# Patient Record
Sex: FEMALE | Race: WHITE | Employment: OTHER | URBAN - METROPOLITAN AREA
[De-identification: names, ages, dates, MRNs, and addresses within clinical notes are randomized per-mention and may not be internally consistent; named-entity substitution may affect disease eponyms.]

---

## 2017-03-06 PROCEDURE — 87081 CULTURE SCREEN ONLY: CPT | Performed by: INTERNAL MEDICINE

## 2017-03-06 PROCEDURE — 81001 URINALYSIS AUTO W/SCOPE: CPT | Performed by: INTERNAL MEDICINE

## 2017-03-06 PROCEDURE — 87086 URINE CULTURE/COLONY COUNT: CPT | Performed by: INTERNAL MEDICINE

## 2017-03-10 ENCOUNTER — ANESTHESIA EVENT (OUTPATIENT)
Dept: SURGERY | Facility: HOSPITAL | Age: 82
DRG: 460 | End: 2017-03-10
Payer: MEDICARE

## 2017-03-14 ENCOUNTER — APPOINTMENT (OUTPATIENT)
Dept: GENERAL RADIOLOGY | Facility: HOSPITAL | Age: 82
DRG: 460 | End: 2017-03-14
Attending: ORTHOPAEDIC SURGERY
Payer: MEDICARE

## 2017-03-14 ENCOUNTER — ANESTHESIA (OUTPATIENT)
Dept: SURGERY | Facility: HOSPITAL | Age: 82
DRG: 460 | End: 2017-03-14
Payer: MEDICARE

## 2017-03-14 ENCOUNTER — SURGERY (OUTPATIENT)
Age: 82
End: 2017-03-14

## 2017-03-14 ENCOUNTER — HOSPITAL ENCOUNTER (INPATIENT)
Facility: HOSPITAL | Age: 82
LOS: 3 days | Discharge: NURSING, REHAB OR LONG-TERM CARE | DRG: 460 | End: 2017-03-17
Attending: ORTHOPAEDIC SURGERY | Admitting: ORTHOPAEDIC SURGERY
Payer: MEDICARE

## 2017-03-14 LAB
ERYTHROCYTE [DISTWIDTH] IN BLOOD BY AUTOMATED COUNT: 12.7 % (ref 11.5–16)
EST. AVERAGE GLUCOSE BLD GHB EST-MCNC: 126 MG/DL (ref 68–126)
GLUCOSE BLD-MCNC: 108 MG/DL (ref 65–99)
GLUCOSE BLD-MCNC: 123 MG/DL (ref 65–99)
GLUCOSE BLD-MCNC: 123 MG/DL (ref 65–99)
GLUCOSE BLD-MCNC: 124 MG/DL (ref 65–99)
HBA1C MFR BLD HPLC: 6 % (ref ?–5.7)
HCT VFR BLD AUTO: 39.1 % (ref 34–50)
HGB BLD-MCNC: 12.8 G/DL (ref 12–16)
MCH RBC QN AUTO: 31.9 PG (ref 27–33.2)
MCHC RBC AUTO-ENTMCNC: 32.7 G/DL (ref 31–37)
MCV RBC AUTO: 97.5 FL (ref 81–100)
PLATELET # BLD AUTO: 122 10(3)UL (ref 150–450)
RBC # BLD AUTO: 4.01 X10(6)UL (ref 3.8–5.1)
RED CELL DISTRIBUTION WIDTH-SD: 45.1 FL (ref 35.1–46.3)
WBC # BLD AUTO: 4.5 X10(3) UL (ref 4–13)

## 2017-03-14 PROCEDURE — 4A11X4G MONITORING OF PERIPHERAL NERVOUS ELECTRICAL ACTIVITY, INTRAOPERATIVE, EXTERNAL APPROACH: ICD-10-PCS | Performed by: ORTHOPAEDIC SURGERY

## 2017-03-14 PROCEDURE — 76001 XR FLUOROSCOPE EXAM >1 HR EXTENSIVE (CPT=76001): CPT

## 2017-03-14 PROCEDURE — 82962 GLUCOSE BLOOD TEST: CPT

## 2017-03-14 PROCEDURE — 83036 HEMOGLOBIN GLYCOSYLATED A1C: CPT | Performed by: HOSPITALIST

## 2017-03-14 PROCEDURE — 85027 COMPLETE CBC AUTOMATED: CPT | Performed by: PHYSICIAN ASSISTANT

## 2017-03-14 PROCEDURE — 0SG1071 FUSION OF 2 OR MORE LUMBAR VERTEBRAL JOINTS WITH AUTOLOGOUS TISSUE SUBSTITUTE, POSTERIOR APPROACH, POSTERIOR COLUMN, OPEN APPROACH: ICD-10-PCS | Performed by: ORTHOPAEDIC SURGERY

## 2017-03-14 DEVICE — GRAFT BN DMNR FBR 30ML: Type: IMPLANTABLE DEVICE | Site: BACK | Status: FUNCTIONAL

## 2017-03-14 DEVICE — FLOSEAL SEALENT STERILE 10ML: Type: IMPLANTABLE DEVICE | Site: BACK | Status: FUNCTIONAL

## 2017-03-14 DEVICE — DURASEAL® EXACT SPINAL SEALANT SYSTEM 5ML 5 PACK
Type: IMPLANTABLE DEVICE | Site: BACK | Status: FUNCTIONAL
Brand: DURASEAL EXACT SPINAL SEALANT SYSTEM

## 2017-03-14 DEVICE — DURAGENXS MATRIX DURAL REGENERATION MATRIX IS AN ABSORBABLE IMPLANT FOR REPAIR OF DURAL DEFECTS. DURAGENXS MATRIX IS AN EASY TO HANDLE, SOFT, WHITE, PLIABLE, NONFRIABLE, POROUS COLLAGEN MATRIX. DURAGENXS MATRIX IS SUPPLIED STERILE, NONPYROGENIC, FOR SINGLE USE IN DOUBLE PEEL PACKAGES IN A VARIETY OF SIZES.
Type: IMPLANTABLE DEVICE | Site: BACK | Status: FUNCTIONAL
Brand: DURAGEN XS™ DURAL REGENERATION MATRIX

## 2017-03-14 RX ORDER — LOSARTAN POTASSIUM 50 MG/1
50 TABLET ORAL DAILY
Status: DISCONTINUED | OUTPATIENT
Start: 2017-03-15 | End: 2017-03-17

## 2017-03-14 RX ORDER — CLONAZEPAM 0.5 MG/1
0.5 TABLET ORAL 2 TIMES DAILY PRN
Status: DISCONTINUED | OUTPATIENT
Start: 2017-03-14 | End: 2017-03-17

## 2017-03-14 RX ORDER — HYDROMORPHONE HYDROCHLORIDE 1 MG/ML
INJECTION, SOLUTION INTRAMUSCULAR; INTRAVENOUS; SUBCUTANEOUS
Status: COMPLETED
Start: 2017-03-14 | End: 2017-03-14

## 2017-03-14 RX ORDER — NALOXONE HYDROCHLORIDE 0.4 MG/ML
80 INJECTION, SOLUTION INTRAMUSCULAR; INTRAVENOUS; SUBCUTANEOUS AS NEEDED
Status: DISCONTINUED | OUTPATIENT
Start: 2017-03-14 | End: 2017-03-14 | Stop reason: HOSPADM

## 2017-03-14 RX ORDER — SODIUM PHOSPHATE, DIBASIC AND SODIUM PHOSPHATE, MONOBASIC 7; 19 G/133ML; G/133ML
1 ENEMA RECTAL ONCE AS NEEDED
Status: ACTIVE | OUTPATIENT
Start: 2017-03-14 | End: 2017-03-14

## 2017-03-14 RX ORDER — ATORVASTATIN CALCIUM 20 MG/1
20 TABLET, FILM COATED ORAL NIGHTLY
Status: DISCONTINUED | OUTPATIENT
Start: 2017-03-14 | End: 2017-03-17

## 2017-03-14 RX ORDER — CYCLOBENZAPRINE HCL 10 MG
10 TABLET ORAL EVERY 8 HOURS PRN
Status: DISCONTINUED | OUTPATIENT
Start: 2017-03-14 | End: 2017-03-17

## 2017-03-14 RX ORDER — DEXTROSE MONOHYDRATE 25 G/50ML
50 INJECTION, SOLUTION INTRAVENOUS
Status: DISCONTINUED | OUTPATIENT
Start: 2017-03-14 | End: 2017-03-17

## 2017-03-14 RX ORDER — SENNOSIDES 8.6 MG
17.2 TABLET ORAL NIGHTLY
Status: DISCONTINUED | OUTPATIENT
Start: 2017-03-14 | End: 2017-03-17

## 2017-03-14 RX ORDER — BUPIVACAINE HYDROCHLORIDE AND EPINEPHRINE 5; 5 MG/ML; UG/ML
INJECTION, SOLUTION EPIDURAL; INTRACAUDAL; PERINEURAL AS NEEDED
Status: DISCONTINUED | OUTPATIENT
Start: 2017-03-14 | End: 2017-03-14 | Stop reason: HOSPADM

## 2017-03-14 RX ORDER — BISACODYL 10 MG
10 SUPPOSITORY, RECTAL RECTAL
Status: DISCONTINUED | OUTPATIENT
Start: 2017-03-14 | End: 2017-03-17

## 2017-03-14 RX ORDER — GENTAMICIN SULFATE 40 MG/ML
INJECTION, SOLUTION INTRAMUSCULAR; INTRAVENOUS AS NEEDED
Status: DISCONTINUED | OUTPATIENT
Start: 2017-03-14 | End: 2017-03-14 | Stop reason: HOSPADM

## 2017-03-14 RX ORDER — PANTOPRAZOLE SODIUM 40 MG/1
40 TABLET, DELAYED RELEASE ORAL
Status: DISCONTINUED | OUTPATIENT
Start: 2017-03-15 | End: 2017-03-17

## 2017-03-14 RX ORDER — DOCUSATE SODIUM 100 MG/1
100 CAPSULE, LIQUID FILLED ORAL 2 TIMES DAILY
Status: DISCONTINUED | OUTPATIENT
Start: 2017-03-14 | End: 2017-03-17

## 2017-03-14 RX ORDER — POLYETHYLENE GLYCOL 3350 17 G/17G
17 POWDER, FOR SOLUTION ORAL DAILY PRN
Status: DISCONTINUED | OUTPATIENT
Start: 2017-03-14 | End: 2017-03-17

## 2017-03-14 RX ORDER — SODIUM CHLORIDE, SODIUM LACTATE, POTASSIUM CHLORIDE, CALCIUM CHLORIDE 600; 310; 30; 20 MG/100ML; MG/100ML; MG/100ML; MG/100ML
INJECTION, SOLUTION INTRAVENOUS CONTINUOUS
Status: DISCONTINUED | OUTPATIENT
Start: 2017-03-14 | End: 2017-03-17

## 2017-03-14 RX ORDER — DIPHENHYDRAMINE HCL 25 MG
25 CAPSULE ORAL EVERY 4 HOURS PRN
Status: DISCONTINUED | OUTPATIENT
Start: 2017-03-14 | End: 2017-03-17

## 2017-03-14 RX ORDER — HYDROCODONE BITARTRATE AND ACETAMINOPHEN 5; 325 MG/1; MG/1
1 TABLET ORAL EVERY 4 HOURS PRN
Status: DISCONTINUED | OUTPATIENT
Start: 2017-03-14 | End: 2017-03-17

## 2017-03-14 RX ORDER — DIPHENHYDRAMINE HYDROCHLORIDE 50 MG/ML
25 INJECTION INTRAMUSCULAR; INTRAVENOUS EVERY 4 HOURS PRN
Status: DISCONTINUED | OUTPATIENT
Start: 2017-03-14 | End: 2017-03-17

## 2017-03-14 RX ORDER — CYCLOBENZAPRINE HCL 5 MG
5 TABLET ORAL 3 TIMES DAILY
Qty: 60 TABLET | Refills: 0 | Status: SHIPPED | OUTPATIENT
Start: 2017-03-14 | End: 2017-05-01

## 2017-03-14 RX ORDER — HYDROCODONE BITARTRATE AND ACETAMINOPHEN 5; 325 MG/1; MG/1
2 TABLET ORAL EVERY 4 HOURS PRN
Status: DISCONTINUED | OUTPATIENT
Start: 2017-03-14 | End: 2017-03-17

## 2017-03-14 RX ORDER — DEXTROSE MONOHYDRATE 25 G/50ML
50 INJECTION, SOLUTION INTRAVENOUS
Status: DISCONTINUED | OUTPATIENT
Start: 2017-03-14 | End: 2017-03-14 | Stop reason: HOSPADM

## 2017-03-14 RX ORDER — LEVOTHYROXINE SODIUM 0.07 MG/1
75 TABLET ORAL
Status: DISCONTINUED | OUTPATIENT
Start: 2017-03-15 | End: 2017-03-17

## 2017-03-14 RX ORDER — HYDROCODONE BITARTRATE AND ACETAMINOPHEN 5; 325 MG/1; MG/1
1 TABLET ORAL EVERY 6 HOURS PRN
Qty: 60 TABLET | Refills: 0 | Status: SHIPPED | OUTPATIENT
Start: 2017-03-14 | End: 2017-03-27 | Stop reason: ALTCHOICE

## 2017-03-14 RX ORDER — ONDANSETRON 2 MG/ML
4 INJECTION INTRAMUSCULAR; INTRAVENOUS EVERY 4 HOURS PRN
Status: DISPENSED | OUTPATIENT
Start: 2017-03-14 | End: 2017-03-15

## 2017-03-14 RX ORDER — NALOXONE HYDROCHLORIDE 0.4 MG/ML
0.08 INJECTION, SOLUTION INTRAMUSCULAR; INTRAVENOUS; SUBCUTANEOUS
Status: DISCONTINUED | OUTPATIENT
Start: 2017-03-14 | End: 2017-03-17

## 2017-03-14 RX ORDER — ONDANSETRON 2 MG/ML
4 INJECTION INTRAMUSCULAR; INTRAVENOUS AS NEEDED
Status: DISCONTINUED | OUTPATIENT
Start: 2017-03-14 | End: 2017-03-14 | Stop reason: HOSPADM

## 2017-03-14 RX ORDER — METOCLOPRAMIDE HYDROCHLORIDE 5 MG/ML
10 INJECTION INTRAMUSCULAR; INTRAVENOUS EVERY 6 HOURS PRN
Status: DISCONTINUED | OUTPATIENT
Start: 2017-03-14 | End: 2017-03-17

## 2017-03-14 RX ORDER — NALBUPHINE HCL 10 MG/ML
2.5 AMPUL (ML) INJECTION EVERY 4 HOURS PRN
Status: DISCONTINUED | OUTPATIENT
Start: 2017-03-14 | End: 2017-03-17

## 2017-03-14 RX ORDER — ONDANSETRON 2 MG/ML
4 INJECTION INTRAMUSCULAR; INTRAVENOUS EVERY 6 HOURS PRN
Status: DISCONTINUED | OUTPATIENT
Start: 2017-03-15 | End: 2017-03-17

## 2017-03-14 RX ORDER — HYDROMORPHONE HYDROCHLORIDE 1 MG/ML
0.4 INJECTION, SOLUTION INTRAMUSCULAR; INTRAVENOUS; SUBCUTANEOUS EVERY 30 MIN PRN
Status: DISCONTINUED | OUTPATIENT
Start: 2017-03-14 | End: 2017-03-17

## 2017-03-14 RX ORDER — DIPHENHYDRAMINE HYDROCHLORIDE 50 MG/ML
12.5 INJECTION INTRAMUSCULAR; INTRAVENOUS EVERY 4 HOURS PRN
Status: DISCONTINUED | OUTPATIENT
Start: 2017-03-14 | End: 2017-03-17

## 2017-03-14 RX ORDER — HYDROMORPHONE HYDROCHLORIDE 1 MG/ML
0.4 INJECTION, SOLUTION INTRAMUSCULAR; INTRAVENOUS; SUBCUTANEOUS EVERY 5 MIN PRN
Status: DISCONTINUED | OUTPATIENT
Start: 2017-03-14 | End: 2017-03-14 | Stop reason: HOSPADM

## 2017-03-14 NOTE — ANESTHESIA PREPROCEDURE EVALUATION
PRE-OP EVALUATION    Patient Name: Jeffrey Fritz    Pre-op Diagnosis: SPINAL STENOSIS, LUMBAR 3-4, LUMBAR 4-5      Procedure(s):  LAMINECTOMY LUMBAR 3-LUMBAR 4, LUMBAR 4-LUMBAR 5  WITH IN SITU FUSION      Surgeon(s) and Role:     * Ramon Jeter MD MG Oral Capsule Delayed Release Take 1 capsule (40 mg total) by mouth every morning before breakfast. Disp: 90 capsule Rfl: 3   ClonazePAM 0.5 MG Oral Tab Take 1 tablet (0.5 mg total) by mouth 2 (two) times daily as needed for Anxiety.  Disp: 30 tablet Rfl: 26.6 03/06/2017   BUN 28* 03/06/2017   CREATSERUM 1.15* 03/06/2017   * 03/06/2017   CA 9.9 03/06/2017       Lab Results  Component Value Date   INR 1.0 03/06/2017         Airway      Mallampati: II  Mouth opening: >3 FB  TM distance: 4 - 6 cm  Neck

## 2017-03-14 NOTE — ANESTHESIA POSTPROCEDURE EVALUATION
[address] Patient Status:  Hospital Outpatient Surgery   Age/Gender 80year old female MRN [id number]   [facility] SURGERY Attending Lidia Crowley MD   Hosp Day # 0 PCP Nayana Grace MD       Anesthesia

## 2017-03-14 NOTE — PROGRESS NOTES
NURSING ADMISSION NOTE      Patient admitted via bed from PACU  Oriented to room. Family at bedside. Safety precautions initiated. Bed in low position. Call light in reach. Instructed to call for assistance always.   Discussed post op orders with pa

## 2017-03-14 NOTE — CONSULTS
[facility] Pharmacy Note:  Pain Consult    Corky Sparks is a 80year old female started on Dilaudid PCA by ALEJANDRO Sofia. Pharmacy was consulted to review medication profile and to discontinue previously ordered narcotics and sedatives.     Medication

## 2017-03-14 NOTE — OPERATIVE REPORT
[facility]  Operative Report    Carlos Enrique Jessup Patient Status:  Hospital Outpatient Surgery    1932 MRN [id number]   [facility] SURGERY Attending Shira Jean MD   Hosp Day # 0 PCP Ryder Rossi MD         PREOP irrigated, obtained lateral fluoroscopy, confirmed the level again. We amputated the spinous processes at the L3-4 and L4-5 levels, thinned out the lamina with a side cutting drill bit. We performed meticulous hemostasis at all time points.  Then, we drille

## 2017-03-14 NOTE — H&P
[facility]    Spine Surgery H&P  Dr. Wilfredo Franz Patient Status:  Hospital Outpatient Surgery    1932 MRN [id number]   Location [address] [place] [address] Attending General Betty MD   [facility]                 Current Medications:    Current Outpatient Prescriptions:  Esomeprazole Magnesium (NEXIUM) 40 MG Oral Capsule Delayed Release  Take 1 capsule (40 mg total) by mouth every morning before breakfast.  Disp: 30 capsule  Rfl: 4    valsartan ( unexpected wt gain or wt loss.   MUSCULOSKELETAL: DENIES:, Muscle cramps, Joint pain, Swelling of joints, History of arthritis, Fibromyalgia, Osteoporosis, Hardware, Deformity, Limited Range of motion, Crepitation, Gout, Heel spurs  ALLERGY/IMM.: denies aurelio No murmur. Abdomen:  Soft, non-distended, non-tender, with no rebound or guarding. No peritoneal signs. No ascites. Liver is within normal limits. Spleen is not palpable. Extremities:  No lower extremity edema noted. Without clubbing or cyanosis.  C

## 2017-03-15 PROBLEM — Z98.1 S/P LAMINECTOMY WITH SPINAL FUSION: Status: ACTIVE | Noted: 2017-03-15

## 2017-03-15 LAB
BASOPHILS # BLD AUTO: 0 X10(3) UL (ref 0–0.1)
BASOPHILS NFR BLD AUTO: 0 %
BUN BLD-MCNC: 21 MG/DL (ref 8–20)
CALCIUM BLD-MCNC: 9.1 MG/DL (ref 8.3–10.3)
CHLORIDE: 106 MMOL/L (ref 101–111)
CO2: 25 MMOL/L (ref 22–32)
CREAT BLD-MCNC: 0.99 MG/DL (ref 0.55–1.02)
EOSINOPHIL # BLD AUTO: 0 X10(3) UL (ref 0–0.3)
EOSINOPHIL NFR BLD AUTO: 0 %
ERYTHROCYTE [DISTWIDTH] IN BLOOD BY AUTOMATED COUNT: 12.6 % (ref 11.5–16)
GLUCOSE BLD-MCNC: 104 MG/DL (ref 65–99)
GLUCOSE BLD-MCNC: 111 MG/DL (ref 65–99)
GLUCOSE BLD-MCNC: 125 MG/DL (ref 65–99)
GLUCOSE BLD-MCNC: 85 MG/DL (ref 65–99)
GLUCOSE BLD-MCNC: 99 MG/DL (ref 70–99)
HCT VFR BLD AUTO: 35.2 % (ref 34–50)
HGB BLD-MCNC: 11.5 G/DL (ref 12–16)
IMMATURE GRANULOCYTE COUNT: 0.02 X10(3) UL (ref 0–1)
IMMATURE GRANULOCYTE RATIO %: 0.4 %
LYMPHOCYTES # BLD AUTO: 0.42 X10(3) UL (ref 0.9–4)
LYMPHOCYTES NFR BLD AUTO: 7.5 %
MCH RBC QN AUTO: 32 PG (ref 27–33.2)
MCHC RBC AUTO-ENTMCNC: 32.7 G/DL (ref 31–37)
MCV RBC AUTO: 98.1 FL (ref 81–100)
MONOCYTES # BLD AUTO: 0.46 X10(3) UL (ref 0.1–0.6)
MONOCYTES NFR BLD AUTO: 8.2 %
NEUTROPHIL ABS PRELIM: 4.71 X10 (3) UL (ref 1.3–6.7)
NEUTROPHILS # BLD AUTO: 4.71 X10(3) UL (ref 1.3–6.7)
NEUTROPHILS NFR BLD AUTO: 83.9 %
PLATELET # BLD AUTO: 116 10(3)UL (ref 150–450)
POTASSIUM SERPL-SCNC: 4.7 MMOL/L (ref 3.6–5.1)
RBC # BLD AUTO: 3.59 X10(6)UL (ref 3.8–5.1)
RED CELL DISTRIBUTION WIDTH-SD: 45.3 FL (ref 35.1–46.3)
SODIUM SERPL-SCNC: 140 MMOL/L (ref 136–144)
WBC # BLD AUTO: 5.6 X10(3) UL (ref 4–13)

## 2017-03-15 PROCEDURE — 97530 THERAPEUTIC ACTIVITIES: CPT

## 2017-03-15 PROCEDURE — 85025 COMPLETE CBC W/AUTO DIFF WBC: CPT | Performed by: HOSPITALIST

## 2017-03-15 PROCEDURE — 80048 BASIC METABOLIC PNL TOTAL CA: CPT | Performed by: HOSPITALIST

## 2017-03-15 PROCEDURE — 97162 PT EVAL MOD COMPLEX 30 MIN: CPT

## 2017-03-15 PROCEDURE — 82962 GLUCOSE BLOOD TEST: CPT

## 2017-03-15 RX ORDER — HYDRALAZINE HYDROCHLORIDE 25 MG/1
25 TABLET, FILM COATED ORAL EVERY 6 HOURS PRN
Status: DISCONTINUED | OUTPATIENT
Start: 2017-03-15 | End: 2017-03-17

## 2017-03-15 NOTE — PROGRESS NOTES
POD #1 spine newsletter provided to patient. Reviewed indications, side effects of pain medication/narcotics and constipation prevention.  Stressed importance of increased fluids/roughage in diet, continued use stool softeners along with laxatives and suppo

## 2017-03-15 NOTE — PROGRESS NOTES
[facility]  Progress Note    Pawan Saul Patient Status:  Inpatient    1932 MRN [id number]   [facility] 3SW-A Attending Adele Kaye MD   Hosp Day # 1 PCP Khoa Khan MD     Subjective:  Pawan Saul is a( bilaterally. Cardiac: Regular rate and rhythm. No murmur. Abdomen:  Soft, non-distended, non-tender, with no rebound or guarding. No peritoneal signs. No ascites. Liver is within normal limits. Spleen is not palpable.       Skin: Normal texture and tur

## 2017-03-15 NOTE — OCCUPATIONAL THERAPY NOTE
Patient not appropriate to see at this time for OT evaluation, is on bedrest today until 5pm according to RN. Will HOLD for OT services until bedrest is no longer prescribed; will re-attempt when appropriate.

## 2017-03-15 NOTE — CONSULTS
[facility] hospitalist initial consult note    Fredy Clark MD  consutled at the request of Dr. Gloria Laureano  Reason for consult medical co-management    HPI 81 yo female with hx of HTN, Dm2, GERD  Here s/p Laminectomy L3, L4, L5 with insitu fusi to Encounter:  valsartan 80 MG Oral Tab Take 1 tablet (80 mg total) by mouth daily. Disp: 90 tablet Rfl: 3   Pioglitazone HCl 15 MG Oral Tab Take 1 tablet (15 mg total) by mouth daily.  Disp: 90 tablet Rfl: 3   Atorvastatin Calcium 20 MG Oral Tab Take 1 tab

## 2017-03-15 NOTE — PHYSICAL THERAPY NOTE
Attempted to see patient for P.T.evaluation, Per RN - Sandra Lemon : patient is on flat bedrest until 5 pm.  Will resume P.T.evaluation once off bedrest.

## 2017-03-15 NOTE — PAYOR COMM NOTE
Attending Physician: Diane Leija MD    Review Type: ADMISSION   Reviewer: Letty Contreras       Date: March 15, 2017 - 10:41 AM  Payor: [address] [place] [address] Number: [id number]  Admit date: 3/14/2017  9:44 AM   Admitted from pertinent family history.                 Current Medications:    Current Outpatient Prescriptions:  Esomeprazole Magnesium (NEXIUM) 40 MG Oral Capsule Delayed Release   Take 1 capsule (40 mg total) by mouth every morning before breakfast.   Disp: 30 capsul Swelling of joints, History of arthritis, Fibromyalgia, Osteoporosis, Hardware, Deformity, Limited Range of motion, Crepitation, Gout, Heel spurs  ALLERGY/IMM.: denies food or seasonal allergies. No history of cancer, infectious disease exposure. difficulty swallowing. Exam is unremarkable.  Without scleral icterus.  Mucous membranes are moist. Pupils are equal and round, reactive to light and accommodate.  Pupils are approximately 3mm and react to 2mm with reaction to light.  Oropharynx is clear. Attending  Joselin Fleming MD    Hosp Day #  0  PCP  Ryder Rossi MD      PREOPERATIVE DIAGNOSIS:  Spinal stenosis  POSTOPERATIVE DIAGNOSIS:  Spinal stenosis , scoliosis  PROCEDURE PERFORMED:   Laminectomy L3, L4, L5 with insitu fusion L3/4 a with insitu fusion L3/4 and L4/5 with bone auto and allograft, dural repair , intraoperative fluoroscopy, use o Microscope.     S/p spine surgery: management per spine  Post-op pain; dilaudid IV ordered  DM2 SSI  HTN: on valsartan as outpatient, monitor BP lactated ringers infusion 125cc/h    Date Action Dose Route User    3/15/2017 0998 New Bag (none) Intravenous Yue Pinto RN    3/14/2017 1611 New Bag (none) Intravenous Ena Lainez RN      Levothyroxine Sodium (SYNTHROID, LEVOTHROID) tab 75 mcg

## 2017-03-15 NOTE — PHYSICAL THERAPY NOTE
PHYSICAL THERAPY EVALUATION - INPATIENT     Room Number: 380/380-A  Evaluation Date: 3/15/2017  Type of Evaluation: Initial  Physician Order: PT Eval and Treat    Presenting Problem: S/p Laminectomy L3,L4,L5 with Insitu Fusion L3-4,L4-5 with bone auto present. Patient self-stated goal is to be able to walk without pain.     OBJECTIVE  Precautions: Spine  Fall Risk: High fall risk    WEIGHT BEARING RESTRICTION  Weight Bearing Restriction: None                PAIN ASSESSMENT  Ratin (@ rest, Improved Approx Degree of Impairment Score: 50.57%   Standardized Score (AM-PAC Scale): 42.13   CMS Modifier (G-Code): CK    FUNCTIONAL ABILITY STATUS  Gait Assessment   Gait Assistance: Dependent assistance (Actual assist - min)  Distance (ft): 30 ft  Assistive Curtis Prime prior level of function. DISCHARGE RECOMMENDATIONS  PT Discharge Recommendations: Home (Family supervision & assist)    PLAN  PT Treatment Plan: Bed mobility; Endurance; Energy conservation;Patient education; Family education;Gait training;Neuromuscular re

## 2017-03-16 LAB
BASOPHILS # BLD AUTO: 0.02 X10(3) UL (ref 0–0.1)
BASOPHILS NFR BLD AUTO: 0.3 %
EOSINOPHIL # BLD AUTO: 0 X10(3) UL (ref 0–0.3)
EOSINOPHIL NFR BLD AUTO: 0 %
ERYTHROCYTE [DISTWIDTH] IN BLOOD BY AUTOMATED COUNT: 13 % (ref 11.5–16)
GLUCOSE BLD-MCNC: 130 MG/DL (ref 65–99)
GLUCOSE BLD-MCNC: 163 MG/DL (ref 65–99)
GLUCOSE BLD-MCNC: 164 MG/DL (ref 65–99)
GLUCOSE BLD-MCNC: 173 MG/DL (ref 65–99)
HCT VFR BLD AUTO: 32.9 % (ref 34–50)
HGB BLD-MCNC: 10.4 G/DL (ref 12–16)
IMMATURE GRANULOCYTE COUNT: 0.03 X10(3) UL (ref 0–1)
IMMATURE GRANULOCYTE RATIO %: 0.4 %
LYMPHOCYTES # BLD AUTO: 0.76 X10(3) UL (ref 0.9–4)
LYMPHOCYTES NFR BLD AUTO: 11.2 %
MCH RBC QN AUTO: 32.2 PG (ref 27–33.2)
MCHC RBC AUTO-ENTMCNC: 31.6 G/DL (ref 31–37)
MCV RBC AUTO: 101.9 FL (ref 81–100)
MONOCYTES # BLD AUTO: 0.93 X10(3) UL (ref 0.1–0.6)
MONOCYTES NFR BLD AUTO: 13.7 %
NEUTROPHIL ABS PRELIM: 5.03 X10 (3) UL (ref 1.3–6.7)
NEUTROPHILS # BLD AUTO: 5.03 X10(3) UL (ref 1.3–6.7)
NEUTROPHILS NFR BLD AUTO: 74.4 %
PLATELET # BLD AUTO: 93 10(3)UL (ref 150–450)
RBC # BLD AUTO: 3.23 X10(6)UL (ref 3.8–5.1)
RED CELL DISTRIBUTION WIDTH-SD: 48.6 FL (ref 35.1–46.3)
WBC # BLD AUTO: 6.8 X10(3) UL (ref 4–13)

## 2017-03-16 PROCEDURE — 97535 SELF CARE MNGMENT TRAINING: CPT

## 2017-03-16 PROCEDURE — 85025 COMPLETE CBC W/AUTO DIFF WBC: CPT | Performed by: HOSPITALIST

## 2017-03-16 PROCEDURE — 97165 OT EVAL LOW COMPLEX 30 MIN: CPT

## 2017-03-16 PROCEDURE — 82962 GLUCOSE BLOOD TEST: CPT

## 2017-03-16 PROCEDURE — 97530 THERAPEUTIC ACTIVITIES: CPT

## 2017-03-16 PROCEDURE — 97116 GAIT TRAINING THERAPY: CPT

## 2017-03-16 NOTE — PROGRESS NOTES
Coaches (daughter and )  attended discharge spine education/snack class. Printed discharge education sheet provided and reviewed. Teach back done. Questions solicited and answered.

## 2017-03-16 NOTE — PROGRESS NOTES
[facility]  Progress Note    Zack Rodriguez Patient Status:  Inpatient    1932 MRN [id number]   [facility] 3SW-A Attending Gordo Vigil MD   Hosp Day # 2 PCP Wayne Tillman MD     Subjective:  Zack Michael is a( non-distended, non-tender, with no rebound or guarding. No peritoneal signs. No ascites. Liver is within normal limits. Spleen is not palpable. Skin: Normal texture and turgor. Lymphatic:  No palpable cervical lymphadenopathy.   Neurologic: Cranial

## 2017-03-16 NOTE — PHYSICAL THERAPY NOTE
PHYSICAL THERAPY TREATMENT NOTE - INPATIENT    Room Number: 380/380-A     Session: 1  Number of Visits to Meet Established Goals: 5    Presenting Problem: S/p Laminectomy L3,L4,L5 with Insitu Fusion L3-4,L4-5 with bone auto & allograft,Dural repair on 03/ MOBILITY  How much difficulty does the patient currently have. ..  -   Turning over in bed (including adjusting bedclothes, sheets and blankets)?: A Little   -   Sitting down on and standing up from a chair with arms (e.g., wheelchair, bedside commode, etc. Insitu Fusion L3-4,L4-5 with bone auto & allograft,Dural repair on 03/14/17. Continues to remain limited with bed mobility & functional transfers skills + gait safety skills due to poorly controlled pain @ surgical site & bilateral LEs.  May consider   Sub A

## 2017-03-16 NOTE — CONSULTS
[address] Patient Status:  Inpatient    1932 MRN [id number]   [facility] 3SW-A Attending Mariah Toletnino MD   Hosp Day # 2 PCP Gayla Sheffield MD     Patient Identification  Guillermina Ponce is a 80 • Anesthesia complication      pt states having hypertension problems immediately post op   • Transient cerebral ischemia 10/2016   • Muscle weakness      aynet legs           Past Surgical History    APPENDECTOMY      CATARACT      LAPAROSCOPIC CHOLECYSTE lactated ringers infusion  Intravenous Continuous   [COMPLETED] CeFAZolin Sodium (ANCEF/KEFZOL) IVPB premix 2 g 2 g Intravenous Q8H   ondansetron HCl (ZOFRAN) injection 4 mg 4 mg Intravenous Q6H PRN   Naloxone HCl (NARCAN) 0.4 MG/ML injection 0.08 mg 0.0 (DEX4) oral liquid 15 g 15 g Oral Q15 Min PRN   Or      Glucose-Vitamin C (DEX-4) 4-0.006 g chewable tab 4 tablet 4 tablet Oral Q15 Min PRN   Or      dextrose injection 50 mL 50 mL Intravenous Q15 Min PRN   Or      glucose (DEX4) oral liquid 30 g 30 g Oral Teeth and gums normal. Moist mucous membranes. Neck: No neck masses or thyroid enlargement/tenderness/nodules. Lungs:   Resonant, clear breath sounds, quiet accessory muscles. Chest wall:  No tenderness or deformity.    Cardiovascular:  Heart wi Thank you for the consult. Ap Duron MD  [facility].

## 2017-03-16 NOTE — CM/SW NOTE
03/16/17 1600   CM/SW Referral Data   Referral Source Nurse; Other  (PT)   Reason for Referral Discharge planning   Informant Patient;Spouse; Children;Edward Staff   Pertinent Medical Hx   Primary Care Physician Name P. Sherryle Park,   Patient Info   Patient's M

## 2017-03-16 NOTE — PROGRESS NOTES
[facility] hospitalist Daily note  Patient was seen/examined on 3/16/17    S; no chest pain, no SOB, no abd pain.     Medications in EPIC    PE:     03/16/17  1614   BP: 135/58   Pulse: 69   Temp: 97.9 °F (36.6 °C)   Resp: 16     Gen: aw

## 2017-03-16 NOTE — CERTIFICATION
**Certification    PHYSICIAN Certification of Need for Inpatient Hospitalization    Based on the her current state of illness, Owen Garza requires inpatient hospitalization for her orthopedic surgery.      This requires inpatient medical treatment long

## 2017-03-16 NOTE — PAYOR COMM NOTE
Dameon Gordon     (MR # [id number])         Order    Admit to inpatient Once [ADT1] (Order [id number])         Order Questions      Question Answer Comment     Admitting Physician NOEL TERAN      Diagnosis S/P laminectomy with spinal fusion      Rolan Novak

## 2017-03-16 NOTE — PROGRESS NOTES
[facility] hospitalist Daily note  Patient was seen/examined on 3/15/17    S; no chest pain, no SOB, no abd pain.  Back pain improved down to 6/10 after PT    Medications in EPIC    PE:   03/15/17  1952   BP:    Pulse:    Temp: 98.1 °F (36.7 °C)   Resp:      G

## 2017-03-16 NOTE — OCCUPATIONAL THERAPY NOTE
OCCUPATIONAL THERAPY EVALUATION - INPATIENT     Room Number: 380/380-A  Evaluation Date: 3/16/2017  Type of Evaluation: Initial  Presenting Problem: s/p L3-5 laminectomy & fusion with dural repair 3/14/17    Physician Order: IP Consult to Occupational Ther roof.\"    Patient self-stated goal is to get better and return home.     OBJECTIVE  Precautions: Spine  Fall Risk: High fall risk    WEIGHT BEARING RESTRICTION  Weight Bearing Restriction: None                PAIN ASSESSMENT  Rating: 10  Location: back  Ma functional transfers; patient required min cueing to follow throughout session > education on UB and LB dressing techniques > LB dressing donning pull-up Depends and pants to knees SBA via reacher, increased time, and mod cueing >  standing via RW and CGA in place; Family present    ASSESSMENT   Patient is a 80year old  female admitted 3/14/2017 for L3-L5 lami w/fusion and dural repair, seen for OT eval 3/16 following bedreset on 3/15.  In this OT evaluation patient presents with the following impairments: i

## 2017-03-17 VITALS
RESPIRATION RATE: 18 BRPM | HEART RATE: 71 BPM | DIASTOLIC BLOOD PRESSURE: 97 MMHG | OXYGEN SATURATION: 98 % | WEIGHT: 171.75 LBS | TEMPERATURE: 98 F | HEIGHT: 63 IN | SYSTOLIC BLOOD PRESSURE: 134 MMHG | BODY MASS INDEX: 30.43 KG/M2

## 2017-03-17 LAB
ERYTHROCYTE [DISTWIDTH] IN BLOOD BY AUTOMATED COUNT: 12.7 % (ref 11.5–16)
GLUCOSE BLD-MCNC: 107 MG/DL (ref 65–99)
GLUCOSE BLD-MCNC: 142 MG/DL (ref 65–99)
HCT VFR BLD AUTO: 29.8 % (ref 34–50)
HGB BLD-MCNC: 9.8 G/DL (ref 12–16)
MCH RBC QN AUTO: 32.5 PG (ref 27–33.2)
MCHC RBC AUTO-ENTMCNC: 32.9 G/DL (ref 31–37)
MCV RBC AUTO: 98.7 FL (ref 81–100)
PLATELET # BLD AUTO: 107 10(3)UL (ref 150–450)
RBC # BLD AUTO: 3.02 X10(6)UL (ref 3.8–5.1)
RED CELL DISTRIBUTION WIDTH-SD: 45.7 FL (ref 35.1–46.3)
WBC # BLD AUTO: 4.9 X10(3) UL (ref 4–13)

## 2017-03-17 PROCEDURE — 97116 GAIT TRAINING THERAPY: CPT

## 2017-03-17 PROCEDURE — 82962 GLUCOSE BLOOD TEST: CPT

## 2017-03-17 PROCEDURE — 97530 THERAPEUTIC ACTIVITIES: CPT

## 2017-03-17 PROCEDURE — 97535 SELF CARE MNGMENT TRAINING: CPT

## 2017-03-17 PROCEDURE — 85027 COMPLETE CBC AUTOMATED: CPT | Performed by: HOSPITALIST

## 2017-03-17 RX ORDER — METOCLOPRAMIDE HYDROCHLORIDE 5 MG/ML
5 INJECTION INTRAMUSCULAR; INTRAVENOUS EVERY 6 HOURS PRN
Status: DISCONTINUED | OUTPATIENT
Start: 2017-03-17 | End: 2017-03-17

## 2017-03-17 RX ORDER — PSEUDOEPHEDRINE HCL 30 MG
100 TABLET ORAL 2 TIMES DAILY PRN
Qty: 20 CAPSULE | Refills: 0 | Status: SHIPPED | OUTPATIENT
Start: 2017-03-17 | End: 2017-05-01

## 2017-03-17 NOTE — PROGRESS NOTES
Pharmacy Note: Renal dose adjustment for Metaclopramide (Reglan)  Jo Ann Mosley has been prescribed Metoclopramide (Reglan) 10 mg every 6 hours as needed. Estimated Creatinine Clearance: 35 mL/min (based on Cr of 0.99).     Her calculated creatinine cl

## 2017-03-17 NOTE — PROGRESS NOTES
[facility] hospitalist Progress Note    S; no chest pain,  no abd pain, no nausea, passed gas    Medications in EPIC    PE:       03/17/17  1500   BP: 134/97   Pulse: 71   Temp:    Resp: 18     Gen: awake, alert, no respiratory distr

## 2017-03-17 NOTE — OCCUPATIONAL THERAPY NOTE
OCCUPATIONAL THERAPY TREATMENT NOTE - INPATIENT     Room Number: 380/380-A  Session: 1   Number of Visits to Meet Established Goals: 3    Presenting Problem: s/p L3-5 laminectomy & fusion with dural repair 3/14/17    History related to current admission: P person does the patient currently need…  -   Putting on and taking off regular lower body clothing?: A Lot  -   Bathing (including washing, rinsing, drying)?: A Little  -   Toileting, which includes using toilet, bedpan or urinal? : A Little  -   Putting o also reinforced on OT role, safety, fall prevention, pain management, and educated on change of discharge recommendations with good verbal understanding. Will continue to follow.    Patient End of Session: Up in chair;Needs met;Call light within reach;RN aw

## 2017-03-17 NOTE — CM/SW NOTE
Informed by RN that pt can transport to rehab via [address]. Spoke with pt's  (pt sleeping) re: [facility]. Also discussed transportation -est cost of [address]. Insurance auth pending for [employer].

## 2017-03-17 NOTE — PHYSICAL THERAPY NOTE
PHYSICAL THERAPY TREATMENT NOTE - INPATIENT    Room Number: 380/380-A     Session: 2  Number of Visits to Meet Established Goals: 5    Presenting Problem: s/p Laminectomy L3,L4,L5 with Insitu fusion L3-L4,L4-L5 with bone auto and allograft,dural repair on difficulty does the patient currently have. ..  -   Turning over in bed (including adjusting bedclothes, sheets and blankets)?: A Little   -   Sitting down on and standing up from a chair with arms (e.g., wheelchair, bedside commode, etc.): A Little   -   M is 80years old female S/p Laminectomy L3,L4,L5 with Insitu Fusion L3-4,L4-5 with bone auto & allograft,Dural repair on 03/14/17. Continues to remain limited with bed mobility, functional transfers, ambulation, and poorly controlled pain in low back at surg

## 2017-03-17 NOTE — CM/SW NOTE
SW noted PMR consult indicating subacute rehab. Spoke with [facility] liaison, Tushar Alfred. She states that [facility] can accept for subacute rehab but will need updated Hb. Spoke with RN re: request and she did obtain order. [facility]

## 2017-03-17 NOTE — CM/SW NOTE
Informed by nurse liaison with Michelle Marcelino, that pt is accepted to [facility] [phone] today with a 5:30PM d/c time. RN report # is [phone]. Katherine Reyna accepted transport as above. SW updated RN  Updated pt/ re: above.   Provided [facility] brochure with

## 2017-03-18 NOTE — CM/SW NOTE
03/18/17 0800   Discharge disposition   Discharged to: Jane Wylie   Name of Facillity/Home Care/Hospice [facility]   Discharge transportation [address] 3/17/17 to [facility] subacute unit

## 2017-03-18 NOTE — DISCHARGE SUMMARY
[facility]  Discharge Summary    Tre Werner Patient Status:  Inpatient    1932 MRN [id number]   [facility] 3SW-A Attending No att. providers found   Hosp Day # 3 PCP Nayana Grace MD     Date of Admission: 3/14/2017 HTN: on valsartan as outpatient, creatinine 0.99  GERD; PPI  Thrombocytopenia;improved, patient denies bleeding, follow up with PCP and check CBC at the follow up with Dr. Wayne Tillman MD    Anemia;advised patient to have CBC checked at the foll ClonazePAM 0.5 MG Oral Tab  Take 1 tablet (0.5 mg total) by mouth 2 (two) times daily as needed for Anxiety., Normal, Disp-30 tablet, R-1    aspirin 325 MG Oral Tab  Take 325 mg by mouth daily. , Historical          Follow up Visits:Alexander Mckeon, · May wear even when toileting. · Anticipate wearing for 6-12 weeks after surgery; exact time to be determined by surgeon. Discuss at office visit.   · Put brace on:  · when sitting/standing at side of bed            Incision site care and dressing  Change · Use an over the counter stool softener such as Colace or senakot while taking narcotics to prevent constipation; use laxatives such as Miralax or Milk of Magnesia as needed. · An enema or suppository may be needed if above measures do not work.     No sm · Temp > 101F; Take your temperature twice a day  · Increased pain, swelling, redness, or any drainage to incision. · Separation of incision. .  · Sudden reappearance of pain that won’t go away with pain medication.   · New numbness or weakness to arms or l

## 2017-04-07 PROBLEM — D69.6 THROMBOCYTOPENIA (HCC): Status: ACTIVE | Noted: 2017-04-07

## 2017-05-12 PROBLEM — M54.50 CHRONIC BILATERAL LOW BACK PAIN WITHOUT SCIATICA: Status: ACTIVE | Noted: 2017-05-12

## 2017-05-12 PROBLEM — G89.29 CHRONIC BILATERAL LOW BACK PAIN WITHOUT SCIATICA: Status: ACTIVE | Noted: 2017-05-12

## 2017-06-23 PROBLEM — Z98.890 H/O CARDIAC RADIOFREQUENCY ABLATION: Status: ACTIVE | Noted: 2017-06-23

## 2017-06-23 PROCEDURE — 81001 URINALYSIS AUTO W/SCOPE: CPT | Performed by: INTERNAL MEDICINE

## 2017-06-23 PROCEDURE — 82746 ASSAY OF FOLIC ACID SERUM: CPT | Performed by: INTERNAL MEDICINE

## 2017-06-23 PROCEDURE — 82607 VITAMIN B-12: CPT | Performed by: INTERNAL MEDICINE

## 2018-03-16 PROBLEM — N18.30 CKD STAGE 3 SECONDARY TO DIABETES (HCC): Status: ACTIVE | Noted: 2018-03-16

## 2018-03-16 PROBLEM — E11.22 CKD STAGE 3 SECONDARY TO DIABETES (HCC): Status: ACTIVE | Noted: 2018-03-16

## 2018-03-16 PROBLEM — I65.21 STENOSIS OF RIGHT CAROTID ARTERY: Status: ACTIVE | Noted: 2018-03-16

## 2018-03-16 PROBLEM — M47.816 LUMBAR FACET ARTHROPATHY: Status: ACTIVE | Noted: 2018-03-16

## 2018-03-16 PROBLEM — D69.6 THROMBOCYTOPENIA (HCC): Status: RESOLVED | Noted: 2017-04-07 | Resolved: 2018-03-16

## 2018-03-16 PROBLEM — I77.9 BILATERAL CAROTID ARTERY DISEASE (HCC): Status: ACTIVE | Noted: 2018-03-16

## 2019-02-26 PROCEDURE — 82570 ASSAY OF URINE CREATININE: CPT | Performed by: INTERNAL MEDICINE

## 2019-02-26 PROCEDURE — 82043 UR ALBUMIN QUANTITATIVE: CPT | Performed by: INTERNAL MEDICINE

## 2019-06-03 PROBLEM — K21.9 GASTROESOPHAGEAL REFLUX DISEASE WITHOUT ESOPHAGITIS: Status: ACTIVE | Noted: 2019-06-03

## 2020-06-01 ENCOUNTER — APPOINTMENT (OUTPATIENT)
Dept: GENERAL RADIOLOGY | Facility: HOSPITAL | Age: 85
End: 2020-06-01
Attending: EMERGENCY MEDICINE
Payer: MEDICARE

## 2020-06-01 ENCOUNTER — HOSPITAL ENCOUNTER (EMERGENCY)
Facility: HOSPITAL | Age: 85
Discharge: HOME | End: 2020-06-01
Attending: EMERGENCY MEDICINE
Payer: MEDICARE

## 2020-06-01 ENCOUNTER — APPOINTMENT (OUTPATIENT)
Dept: CT IMAGING | Facility: HOSPITAL | Age: 85
End: 2020-06-01
Attending: EMERGENCY MEDICINE
Payer: MEDICARE

## 2020-06-01 VITALS
HEART RATE: 68 BPM | HEIGHT: 61 IN | WEIGHT: 158 LBS | DIASTOLIC BLOOD PRESSURE: 60 MMHG | OXYGEN SATURATION: 98 % | RESPIRATION RATE: 18 BRPM | SYSTOLIC BLOOD PRESSURE: 147 MMHG | TEMPERATURE: 98 F | BODY MASS INDEX: 29.83 KG/M2

## 2020-06-01 DIAGNOSIS — I10 UNCONTROLLED HYPERTENSION: Primary | ICD-10-CM

## 2020-06-01 PROCEDURE — 93010 ELECTROCARDIOGRAM REPORT: CPT

## 2020-06-01 PROCEDURE — 85730 THROMBOPLASTIN TIME PARTIAL: CPT | Performed by: EMERGENCY MEDICINE

## 2020-06-01 PROCEDURE — 71045 X-RAY EXAM CHEST 1 VIEW: CPT | Performed by: EMERGENCY MEDICINE

## 2020-06-01 PROCEDURE — 80053 COMPREHEN METABOLIC PANEL: CPT | Performed by: EMERGENCY MEDICINE

## 2020-06-01 PROCEDURE — 70450 CT HEAD/BRAIN W/O DYE: CPT | Performed by: EMERGENCY MEDICINE

## 2020-06-01 PROCEDURE — 96376 TX/PRO/DX INJ SAME DRUG ADON: CPT

## 2020-06-01 PROCEDURE — 99285 EMERGENCY DEPT VISIT HI MDM: CPT

## 2020-06-01 PROCEDURE — 83880 ASSAY OF NATRIURETIC PEPTIDE: CPT | Performed by: EMERGENCY MEDICINE

## 2020-06-01 PROCEDURE — 96374 THER/PROPH/DIAG INJ IV PUSH: CPT

## 2020-06-01 PROCEDURE — 93005 ELECTROCARDIOGRAM TRACING: CPT

## 2020-06-01 PROCEDURE — 85025 COMPLETE CBC W/AUTO DIFF WBC: CPT | Performed by: EMERGENCY MEDICINE

## 2020-06-01 PROCEDURE — 84484 ASSAY OF TROPONIN QUANT: CPT | Performed by: EMERGENCY MEDICINE

## 2020-06-01 PROCEDURE — 81001 URINALYSIS AUTO W/SCOPE: CPT | Performed by: EMERGENCY MEDICINE

## 2020-06-01 PROCEDURE — 85610 PROTHROMBIN TIME: CPT | Performed by: EMERGENCY MEDICINE

## 2020-06-01 RX ORDER — HYDRALAZINE HYDROCHLORIDE 20 MG/ML
10 INJECTION INTRAMUSCULAR; INTRAVENOUS ONCE
Status: COMPLETED | OUTPATIENT
Start: 2020-06-01 | End: 2020-06-01

## 2020-06-01 RX ORDER — HYDRALAZINE HYDROCHLORIDE 20 MG/ML
5 INJECTION INTRAMUSCULAR; INTRAVENOUS ONCE
Status: COMPLETED | OUTPATIENT
Start: 2020-06-01 | End: 2020-06-01

## 2020-06-01 NOTE — ED INITIAL ASSESSMENT (HPI)
Pt reports tingling to bilateral legs since yesterday. Hypertensive upon arrival. Denies headaches or dizziness.

## 2020-06-02 NOTE — ED PROVIDER NOTES
Patient Seen in: [facility] Emergency Department      History   Patient presents with:  Hypertension    Stated Complaint: leg tingling, HTN    HPI    Patient presents with hypertension.   The patient states that yesterday morning her blood pressure w Smoker        Packs/day: 1.00        Years: 3.00        Pack years: 3        Quit date: 1964        Years since quittin.4      Smokeless tobacco: Never Used    Alcohol use: No      Alcohol/week: 0.0 standard drinks    Drug use:  No             Revi ACTIVATED - Normal   RAPID SARS-COV-2 BY PCR - Normal   CBC WITH DIFFERENTIAL WITH PLATELET    Narrative: The following orders were created for panel order CBC WITH DIFFERENTIAL WITH PLATELET.   Procedure                               Abnormality MASTOIDS:          No sign of acute inflammation. SKULL:             No evidence for fracture or osseous abnormality. OTHER:             No significant change. CONCLUSION:  No acute intracranial pathology.    Dictated by: Joan David MD on 6/01/2020 at 5 plan.    Disposition and Plan     Clinical Impression:  Uncontrolled hypertension  (primary encounter diagnosis)    Disposition:  Discharge  6/1/2020  6:38 pm    Follow-up:  Sania Hernandez MD  [facility]. [phone]  [phone]

## 2021-04-11 DIAGNOSIS — Z23 NEED FOR VACCINATION: ICD-10-CM

## 2022-02-15 ENCOUNTER — APPOINTMENT (OUTPATIENT)
Dept: CT IMAGING | Facility: HOSPITAL | Age: 87
End: 2022-02-15
Attending: EMERGENCY MEDICINE
Payer: MEDICARE

## 2022-02-15 ENCOUNTER — HOSPITAL ENCOUNTER (EMERGENCY)
Facility: HOSPITAL | Age: 87
Discharge: HOME | End: 2022-02-15
Attending: EMERGENCY MEDICINE
Payer: MEDICARE

## 2022-02-15 VITALS
DIASTOLIC BLOOD PRESSURE: 80 MMHG | HEART RATE: 87 BPM | SYSTOLIC BLOOD PRESSURE: 180 MMHG | TEMPERATURE: 97 F | RESPIRATION RATE: 16 BRPM | OXYGEN SATURATION: 97 %

## 2022-02-15 DIAGNOSIS — M54.50 CHRONIC LOW BACK PAIN WITHOUT SCIATICA, UNSPECIFIED BACK PAIN LATERALITY: Primary | ICD-10-CM

## 2022-02-15 DIAGNOSIS — M47.816 SPONDYLOSIS OF LUMBAR REGION WITHOUT MYELOPATHY OR RADICULOPATHY: ICD-10-CM

## 2022-02-15 DIAGNOSIS — G89.29 CHRONIC LOW BACK PAIN WITHOUT SCIATICA, UNSPECIFIED BACK PAIN LATERALITY: Primary | ICD-10-CM

## 2022-02-15 DIAGNOSIS — M48.061 SPINAL STENOSIS OF LUMBAR REGION WITHOUT NEUROGENIC CLAUDICATION: ICD-10-CM

## 2022-02-15 PROCEDURE — 72131 CT LUMBAR SPINE W/O DYE: CPT | Performed by: EMERGENCY MEDICINE

## 2022-02-15 PROCEDURE — 99284 EMERGENCY DEPT VISIT MOD MDM: CPT

## 2022-02-15 RX ORDER — HYDROCODONE BITARTRATE AND ACETAMINOPHEN 5; 325 MG/1; MG/1
1 TABLET ORAL EVERY 6 HOURS PRN
Qty: 20 TABLET | Refills: 0 | Status: SHIPPED | OUTPATIENT
Start: 2022-02-15 | End: 2022-03-14

## 2022-02-15 RX ORDER — LIDOCAINE 50 MG/G
1 PATCH TOPICAL EVERY 24 HOURS
Qty: 30 PATCH | Refills: 0 | Status: SHIPPED | OUTPATIENT
Start: 2022-02-15 | End: 2022-03-14

## 2022-02-15 RX ORDER — HYDROCODONE BITARTRATE AND ACETAMINOPHEN 5; 325 MG/1; MG/1
1 TABLET ORAL ONCE
Status: COMPLETED | OUTPATIENT
Start: 2022-02-15 | End: 2022-02-15

## 2022-02-15 NOTE — ED INITIAL ASSESSMENT (HPI)
Pt arrives via EMS from [facility], states she has had chronic back pain, has had surgery in the past. Lately the pain has gotten worse, takes tylenol with no relief. States it is mid back pain that radiates down left leg. A&O x3.

## 2022-03-13 PROBLEM — I12.9 HYPERTENSIVE KIDNEY DISEASE WITH CHRONIC KIDNEY DISEASE STAGE III (HCC): Status: ACTIVE | Noted: 2022-03-13

## 2022-03-13 PROBLEM — N18.30 HYPERTENSIVE KIDNEY DISEASE WITH CHRONIC KIDNEY DISEASE STAGE III (HCC): Status: ACTIVE | Noted: 2022-03-13

## 2022-03-14 PROBLEM — E11.65 CONTROLLED TYPE 2 DIABETES MELLITUS WITH HYPERGLYCEMIA, WITHOUT LONG-TERM CURRENT USE OF INSULIN (HCC): Status: ACTIVE | Noted: 2022-03-14

## 2022-05-11 RX ORDER — HYDROCODONE BITARTRATE AND ACETAMINOPHEN 5; 325 MG/1; MG/1
1 TABLET ORAL EVERY 6 HOURS PRN
COMMUNITY

## 2022-05-12 RX ORDER — SODIUM CHLORIDE, SODIUM LACTATE, POTASSIUM CHLORIDE, CALCIUM CHLORIDE 600; 310; 30; 20 MG/100ML; MG/100ML; MG/100ML; MG/100ML
INJECTION, SOLUTION INTRAVENOUS CONTINUOUS
Status: CANCELLED | OUTPATIENT
Start: 2022-05-12

## 2022-05-13 ENCOUNTER — LAB ENCOUNTER (OUTPATIENT)
Dept: LAB | Facility: HOSPITAL | Age: 87
End: 2022-05-13
Attending: INTERNAL MEDICINE
Payer: MEDICARE

## 2022-05-13 DIAGNOSIS — C67.9 BLADDER CANCER (HCC): ICD-10-CM

## 2022-05-14 LAB — SARS-COV-2 RNA RESP QL NAA+PROBE: NOT DETECTED

## 2022-05-16 ENCOUNTER — ANESTHESIA (OUTPATIENT)
Dept: ENDOSCOPY | Facility: HOSPITAL | Age: 87
End: 2022-05-16
Payer: MEDICARE

## 2022-05-16 ENCOUNTER — HOSPITAL ENCOUNTER (OUTPATIENT)
Facility: HOSPITAL | Age: 87
Setting detail: HOSPITAL OUTPATIENT SURGERY
Discharge: HOME | End: 2022-05-16
Attending: INTERNAL MEDICINE | Admitting: INTERNAL MEDICINE
Payer: MEDICARE

## 2022-05-16 ENCOUNTER — ANESTHESIA EVENT (OUTPATIENT)
Dept: ENDOSCOPY | Facility: HOSPITAL | Age: 87
End: 2022-05-16
Payer: MEDICARE

## 2022-05-16 VITALS
RESPIRATION RATE: 18 BRPM | HEART RATE: 70 BPM | OXYGEN SATURATION: 96 % | BODY MASS INDEX: 27.73 KG/M2 | WEIGHT: 145 LBS | SYSTOLIC BLOOD PRESSURE: 92 MMHG | TEMPERATURE: 98 F | DIASTOLIC BLOOD PRESSURE: 64 MMHG | HEIGHT: 60.5 IN

## 2022-05-16 DIAGNOSIS — C67.9 BLADDER CANCER (HCC): Primary | ICD-10-CM

## 2022-05-16 DIAGNOSIS — K86.89 PANCREATIC MASS: ICD-10-CM

## 2022-05-16 LAB — GLUCOSE BLD-MCNC: 131 MG/DL (ref 70–99)

## 2022-05-16 PROCEDURE — 88185 FLOWCYTOMETRY/TC ADD-ON: CPT

## 2022-05-16 PROCEDURE — 0FBG8ZX EXCISION OF PANCREAS, VIA NATURAL OR ARTIFICIAL OPENING ENDOSCOPIC, DIAGNOSTIC: ICD-10-PCS | Performed by: INTERNAL MEDICINE

## 2022-05-16 PROCEDURE — 88173 CYTOPATH EVAL FNA REPORT: CPT | Performed by: INTERNAL MEDICINE

## 2022-05-16 PROCEDURE — 82962 GLUCOSE BLOOD TEST: CPT

## 2022-05-16 PROCEDURE — 88172 CYTP DX EVAL FNA 1ST EA SITE: CPT | Performed by: INTERNAL MEDICINE

## 2022-05-16 PROCEDURE — 0DB68ZX EXCISION OF STOMACH, VIA NATURAL OR ARTIFICIAL OPENING ENDOSCOPIC, DIAGNOSTIC: ICD-10-PCS | Performed by: INTERNAL MEDICINE

## 2022-05-16 PROCEDURE — 88305 TISSUE EXAM BY PATHOLOGIST: CPT | Performed by: INTERNAL MEDICINE

## 2022-05-16 PROCEDURE — 88341 IMHCHEM/IMCYTCHM EA ADD ANTB: CPT | Performed by: INTERNAL MEDICINE

## 2022-05-16 PROCEDURE — 88342 IMHCHEM/IMCYTCHM 1ST ANTB: CPT | Performed by: INTERNAL MEDICINE

## 2022-05-16 PROCEDURE — 88184 FLOWCYTOMETRY/ TC 1 MARKER: CPT

## 2022-05-16 PROCEDURE — 88307 TISSUE EXAM BY PATHOLOGIST: CPT | Performed by: INTERNAL MEDICINE

## 2022-05-16 RX ORDER — LIDOCAINE HYDROCHLORIDE 10 MG/ML
INJECTION, SOLUTION EPIDURAL; INFILTRATION; INTRACAUDAL; PERINEURAL AS NEEDED
Status: DISCONTINUED | OUTPATIENT
Start: 2022-05-16 | End: 2022-05-16 | Stop reason: SURG

## 2022-05-16 RX ORDER — SODIUM CHLORIDE, SODIUM LACTATE, POTASSIUM CHLORIDE, CALCIUM CHLORIDE 600; 310; 30; 20 MG/100ML; MG/100ML; MG/100ML; MG/100ML
INJECTION, SOLUTION INTRAVENOUS CONTINUOUS
Status: DISCONTINUED | OUTPATIENT
Start: 2022-05-16 | End: 2022-05-16

## 2022-05-16 RX ORDER — HYDROMORPHONE HYDROCHLORIDE 1 MG/ML
0.4 INJECTION, SOLUTION INTRAMUSCULAR; INTRAVENOUS; SUBCUTANEOUS EVERY 5 MIN PRN
Status: DISCONTINUED | OUTPATIENT
Start: 2022-05-16 | End: 2022-05-16

## 2022-05-16 RX ORDER — PHENYLEPHRINE HCL 10 MG/ML
VIAL (ML) INJECTION AS NEEDED
Status: DISCONTINUED | OUTPATIENT
Start: 2022-05-16 | End: 2022-05-16 | Stop reason: SURG

## 2022-05-16 RX ORDER — HYDROMORPHONE HYDROCHLORIDE 1 MG/ML
0.2 INJECTION, SOLUTION INTRAMUSCULAR; INTRAVENOUS; SUBCUTANEOUS EVERY 5 MIN PRN
Status: DISCONTINUED | OUTPATIENT
Start: 2022-05-16 | End: 2022-05-16

## 2022-05-16 RX ORDER — NICOTINE POLACRILEX 4 MG
15 LOZENGE BUCCAL
Status: DISCONTINUED | OUTPATIENT
Start: 2022-05-16 | End: 2022-05-16

## 2022-05-16 RX ORDER — NALOXONE HYDROCHLORIDE 0.4 MG/ML
80 INJECTION, SOLUTION INTRAMUSCULAR; INTRAVENOUS; SUBCUTANEOUS AS NEEDED
Status: DISCONTINUED | OUTPATIENT
Start: 2022-05-16 | End: 2022-05-16

## 2022-05-16 RX ORDER — NICOTINE POLACRILEX 4 MG
30 LOZENGE BUCCAL
Status: DISCONTINUED | OUTPATIENT
Start: 2022-05-16 | End: 2022-05-16

## 2022-05-16 RX ORDER — DEXTROSE MONOHYDRATE 25 G/50ML
50 INJECTION, SOLUTION INTRAVENOUS
Status: DISCONTINUED | OUTPATIENT
Start: 2022-05-16 | End: 2022-05-16

## 2022-05-16 RX ORDER — HYDROMORPHONE HYDROCHLORIDE 1 MG/ML
0.6 INJECTION, SOLUTION INTRAMUSCULAR; INTRAVENOUS; SUBCUTANEOUS EVERY 5 MIN PRN
Status: DISCONTINUED | OUTPATIENT
Start: 2022-05-16 | End: 2022-05-16

## 2022-05-16 RX ADMIN — PHENYLEPHRINE HCL 100 MCG: 10 MG/ML VIAL (ML) INJECTION at 14:46:00

## 2022-05-16 RX ADMIN — SODIUM CHLORIDE, SODIUM LACTATE, POTASSIUM CHLORIDE, CALCIUM CHLORIDE: 600; 310; 30; 20 INJECTION, SOLUTION INTRAVENOUS at 15:04:00

## 2022-05-16 RX ADMIN — LIDOCAINE HYDROCHLORIDE 25 MG: 10 INJECTION, SOLUTION EPIDURAL; INFILTRATION; INTRACAUDAL; PERINEURAL at 14:42:00

## 2022-05-16 RX ADMIN — SODIUM CHLORIDE, SODIUM LACTATE, POTASSIUM CHLORIDE, CALCIUM CHLORIDE: 600; 310; 30; 20 INJECTION, SOLUTION INTRAVENOUS at 14:41:00

## 2022-05-16 NOTE — OPERATIVE REPORT
OPERATIVE REPORT   PATIENT NAME: Tre Muller  MRN: [id number]  DATE OF OPERATION: 5/16/2022  PREOPERATIVE DIAGNOSIS:   1. Pancreatic body mass with encasement of the SMV and involvement of the SMA. 2. Abnormal CT scan that revealed questionable gastric body lesion  POSTOPERATIVE DIAGNOSES:  1. Large pancreatic body mass with encasement of the SMV and superior mesenteric artery. 2. Gastric pedunculated polyp most likely hyperplastic polyp. PROCEDURE PERFORMED:  1. Linear endoscopic ultrasound with fine-needle biopsy  2. Upper endoscopy with biopsy  SURGEON: Stas Brown MD   MEDICATIONS: None. ANESTHESIA: MAC anesthesia  CONSENT: Informed and obtained from the patient  SPECIMEN: Pancreatic body mass as well as gastric polyp  COMPLICATIONS: None immediately apparent  PROCEDURE AND FINDINGS:       After achieving adequate sedation and placing the patient in the left lateral decubitus position the Olympus linear echoendoscope was introduced under direct visualization in the esophagus passed into the stomach and second portion of the duodenum. There appeared to be a large hypoechoic mass in the range of 4.5 cm with irregular borders in the body of the pancreas encasing the superior mesenteric vein and superior mesenteric artery. The mass was sampled through the gastric wall with the 22-gauge [facility] Sid[employer]age fine-needle biopsy device x2 needle passes. The material was sent for histology as well as flow cytometry as per request of the cytopathologist onsite. Doppler studies were used to ensure that there are no interposing blood vessels in the needle track. On the endoscopic view of the echoendoscope there appeared to be a pending collated polyp in the body of the stomach. It has a friable surface. To better visualized the lesion the scope was exchanged for the standard adult upper scope.       While the patient was in the left lateral position and once an adequate level of  sedation was achieved, the lubricated tip of an Olympus video gastroscope was introduced into the mouth and the esophagus was intubated under direct visualization. A complete examination of the esophagus, stomach and duodenum was performed including retroflexion in the stomach to view the cardia and fundus. The endoscope was straightened and removed, and the procedure was completed. The patient tolerated the procedure well. There were no immediate complications. The patient was given a written copy of their results at discharge. FINDINGS:  1. Normal esophagus with no evidence of esophagitis, stricture, ulceration, mass or other abnormalities. The squamocolumnar junction was intact. The esophagogastric junction was patent. There were no endoscopic features of eosinophilic esophagitis or Campbell's esophagus. No esophageal varices seen. 2.  In the mid body of the stomach there was a large pedunculated polyp measuring approximately 2 cm in size with erythematous, slightly friable surface. The polyp most likely present a hyperplastic polyp. There were multiple fundic gland appearing polyps seen. More distal to it there was another polyp in the range of 1 cm and once again it appeared to be pedunculated. Biopsies were taken from the larger polyp. 3. Normal duodenum to the second portion with no ulcers or masses seen. IMPRESSION:  1. Findings described above  RECOMMENDATIONS:  1. Await final biopsy results. 2. Can resume Plavix in a.m.   Alejandro Marshall MD

## 2022-05-20 LAB
CD10 CELLS NFR SPEC: 73 %
CD11C CELLS NFR SPEC: 2 %
CD14 CELLS NFR SPEC: 1 %
CD19 CELLS NFR SPEC: 71 %
CD19/CD10 CELLS: 71 %
CD20 CELLS NFR SPEC: 73 %
CD22 CELLS NFR SPEC: 65 %
CD23 CELLS NFR SPEC: 52 %
CD3 CELLS NFR SPEC: 26 %
CD3+CD4+ CELLS NFR SPEC: 6 %
CD3+CD4+ CELLS/CD3+CD8+ CLL SPEC: 0.3
CD3+CD8+ CELLS NFR SPEC: 19 %
CD45 CELLS NFR SPEC: 100 %
CD5 CELLS NFR SPEC: 25 %
CD5/CD19 CELLS: 1 %
CD56 CELLS NFR SPEC: 1 %
CD7 CELLS NFR SPEC: 23 %
CELL SURF LAMBDA LIGHT CHAIN: 68 %
CELL SURFACE KAPPA LIGHT CHAIN: <1 %
FMC7 CELLS NFR SPEC: 72 %

## 2022-06-30 ENCOUNTER — HOSPITAL ENCOUNTER (EMERGENCY)
Facility: HOSPITAL | Age: 87
Discharge: HOME | End: 2022-06-30
Attending: EMERGENCY MEDICINE
Payer: MEDICARE

## 2022-06-30 VITALS
BODY MASS INDEX: 25.35 KG/M2 | SYSTOLIC BLOOD PRESSURE: 89 MMHG | HEART RATE: 80 BPM | WEIGHT: 136 LBS | RESPIRATION RATE: 17 BRPM | HEIGHT: 61.42 IN | DIASTOLIC BLOOD PRESSURE: 60 MMHG | OXYGEN SATURATION: 99 % | TEMPERATURE: 97 F

## 2022-06-30 DIAGNOSIS — M54.50 ACUTE LOW BACK PAIN, UNSPECIFIED BACK PAIN LATERALITY, UNSPECIFIED WHETHER SCIATICA PRESENT: Primary | ICD-10-CM

## 2022-06-30 LAB
ALBUMIN SERPL-MCNC: 3.4 G/DL (ref 3.4–5)
ALBUMIN/GLOB SERPL: 0.9 {RATIO} (ref 1–2)
ALP LIVER SERPL-CCNC: 101 U/L
ALT SERPL-CCNC: 18 U/L
ANION GAP SERPL CALC-SCNC: 10 MMOL/L (ref 0–18)
AST SERPL-CCNC: 17 U/L (ref 15–37)
BASOPHILS # BLD AUTO: 0.04 X10(3) UL (ref 0–0.2)
BASOPHILS NFR BLD AUTO: 0.6 %
BILIRUB SERPL-MCNC: 0.7 MG/DL (ref 0.1–2)
BUN BLD-MCNC: 31 MG/DL (ref 7–18)
CALCIUM BLD-MCNC: 10.1 MG/DL (ref 8.5–10.1)
CHLORIDE SERPL-SCNC: 99 MMOL/L (ref 98–112)
CO2 SERPL-SCNC: 24 MMOL/L (ref 21–32)
CREAT BLD-MCNC: 1.58 MG/DL
EOSINOPHIL # BLD AUTO: 0.08 X10(3) UL (ref 0–0.7)
EOSINOPHIL NFR BLD AUTO: 1.2 %
ERYTHROCYTE [DISTWIDTH] IN BLOOD BY AUTOMATED COUNT: 14.2 %
GLOBULIN PLAS-MCNC: 3.9 G/DL (ref 2.8–4.4)
GLUCOSE BLD-MCNC: 203 MG/DL (ref 70–99)
HCT VFR BLD AUTO: 36.2 %
HGB BLD-MCNC: 11.3 G/DL
IMM GRANULOCYTES # BLD AUTO: 0.03 X10(3) UL (ref 0–1)
IMM GRANULOCYTES NFR BLD: 0.4 %
LIPASE SERPL-CCNC: 376 U/L (ref 73–393)
LYMPHOCYTES # BLD AUTO: 0.53 X10(3) UL (ref 1–4)
LYMPHOCYTES NFR BLD AUTO: 7.6 %
MCH RBC QN AUTO: 29.4 PG (ref 26–34)
MCHC RBC AUTO-ENTMCNC: 31.2 G/DL (ref 31–37)
MCV RBC AUTO: 94.3 FL
MONOCYTES # BLD AUTO: 0.62 X10(3) UL (ref 0.1–1)
MONOCYTES NFR BLD AUTO: 8.9 %
NEUTROPHILS # BLD AUTO: 5.64 X10 (3) UL (ref 1.5–7.7)
NEUTROPHILS # BLD AUTO: 5.64 X10(3) UL (ref 1.5–7.7)
NEUTROPHILS NFR BLD AUTO: 81.3 %
OSMOLALITY SERPL CALC.SUM OF ELEC: 288 MOSM/KG (ref 275–295)
PLATELET # BLD AUTO: 171 10(3)UL (ref 150–450)
POTASSIUM SERPL-SCNC: 3.9 MMOL/L (ref 3.5–5.1)
PROT SERPL-MCNC: 7.3 G/DL (ref 6.4–8.2)
RBC # BLD AUTO: 3.84 X10(6)UL
SODIUM SERPL-SCNC: 133 MMOL/L (ref 136–145)
WBC # BLD AUTO: 6.9 X10(3) UL (ref 4–11)

## 2022-06-30 PROCEDURE — 96361 HYDRATE IV INFUSION ADD-ON: CPT

## 2022-06-30 PROCEDURE — 99284 EMERGENCY DEPT VISIT MOD MDM: CPT

## 2022-06-30 PROCEDURE — 96375 TX/PRO/DX INJ NEW DRUG ADDON: CPT

## 2022-06-30 PROCEDURE — 96374 THER/PROPH/DIAG INJ IV PUSH: CPT

## 2022-06-30 PROCEDURE — 80053 COMPREHEN METABOLIC PANEL: CPT | Performed by: EMERGENCY MEDICINE

## 2022-06-30 PROCEDURE — 83690 ASSAY OF LIPASE: CPT | Performed by: EMERGENCY MEDICINE

## 2022-06-30 PROCEDURE — 85025 COMPLETE CBC W/AUTO DIFF WBC: CPT | Performed by: EMERGENCY MEDICINE

## 2022-06-30 RX ORDER — ONDANSETRON 2 MG/ML
4 INJECTION INTRAMUSCULAR; INTRAVENOUS ONCE
Status: COMPLETED | OUTPATIENT
Start: 2022-06-30 | End: 2022-06-30

## 2022-06-30 RX ORDER — MORPHINE SULFATE 4 MG/ML
4 INJECTION, SOLUTION INTRAMUSCULAR; INTRAVENOUS ONCE
Status: COMPLETED | OUTPATIENT
Start: 2022-06-30 | End: 2022-06-30

## 2022-06-30 NOTE — ED INITIAL ASSESSMENT (HPI)
Pt c/o of flank pain that radiates to her lower abdomen and nausea. Pt denies urinary symptoms. Pt took norco 2 hours PTA and denies relief. Recent diagnosis of lymphoma. Pt denied treatment and in hospice care.

## 2022-07-01 ENCOUNTER — TELEPHONE (OUTPATIENT)
Dept: CASE MANAGEMENT | Facility: HOSPITAL | Age: 87
End: 2022-07-01

## 2022-07-01 NOTE — PROGRESS NOTES
Received a call back from daughter, Lael Fothergill. Lael Fothergill just got back in town and was aware the  was going to be calling today. Explained to Lael Fothergill that it appears patient and spouse are not completely in agreement with hospice and that Therese Brice, hospice care consultant, is in agreement. Explained that it would be beneficial for Lael Fothergill to be involved with plan of care. Lael Fothergill said she had not gotten a chance to speak with anyone from hospice (because she was out of town) but she had read all the hospice information. And she agrees that patient and spouse are unsure of what to do and what hospice is, exactly. Lael Fothergill very interested in speaking with Therese Brice from hospice.  provided phone number. Lael Fothergill expressed gratitude for phone call. She plans on being involved in decision making now that she is home.

## 2022-07-01 NOTE — CM/SW NOTE
LATE ENTRY:    Spoke to patient and  while she was in the ED on 6/30/22.  states [facility], \"but switched to [address] [place] [address] overnight\". They live at [address] [facility] there recommended [address] [facility]. Both patient and  are not clear what the current situation with hospice is. They said they signed a contract and they think someone has been to the house twice. It does not seem like patient is completely on board with being enrolled in hospice. Patient states she does not take the Lorenzo Garcia she has at home regularly. CM will call daughter, Eric Ruelas, [phone] today to discuss. Both patient and  are in agreement with this. Patient is wishing to be discharged home.

## 2022-07-01 NOTE — PROGRESS NOTES
Per [facility], patient is using [facility], [phone]. Rafa Felix is the patient's hospice care consultant, [phone] and Fransisco Anderson is the patient's RN case manager. Both of them have seen and talked to patient and . There is also a home aide that is scheduled to be at patient's home today. CM called Rafa Felix. Per Rafa Felix, patient is currently under the care of [facility] hospice. Patient was interested in comfort focused care originally, but is somewhat reluctant/unsure now if that is what she wants. Rafa Felix has been talking to patient about revoking hospice. Festus Romero is ongoing - no decision has been made. Prior to patient coming to the ED yesterday,  did call [facility]. A triage nurse was scheduled to go to patient's house, but after several attempts neither the patient nor  could be reached. Patient came to the ED. CM discussed with Rafa Felix getting family involved. Patient and  did give this CM permission to call their daughter, Ladan Ny, today, as she is involved in care. CM called Tristian Ram, [phone]. [facility] answer. [facility]

## 2022-08-18 ENCOUNTER — APPOINTMENT (OUTPATIENT)
Dept: GENERAL RADIOLOGY | Facility: HOSPITAL | Age: 87
End: 2022-08-18
Attending: STUDENT IN AN ORGANIZED HEALTH CARE EDUCATION/TRAINING PROGRAM
Payer: MEDICARE

## 2022-08-18 ENCOUNTER — APPOINTMENT (OUTPATIENT)
Dept: CT IMAGING | Facility: HOSPITAL | Age: 87
End: 2022-08-18
Attending: STUDENT IN AN ORGANIZED HEALTH CARE EDUCATION/TRAINING PROGRAM
Payer: MEDICARE

## 2022-08-18 ENCOUNTER — HOSPITAL ENCOUNTER (EMERGENCY)
Facility: HOSPITAL | Age: 87
Discharge: HOME | End: 2022-08-19
Attending: STUDENT IN AN ORGANIZED HEALTH CARE EDUCATION/TRAINING PROGRAM
Payer: MEDICARE
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
## 2022-08-18 DIAGNOSIS — S09.90XA INJURY OF HEAD, INITIAL ENCOUNTER: ICD-10-CM

## 2022-08-18 DIAGNOSIS — S20.229A CONTUSION OF BACK, UNSPECIFIED LATERALITY, INITIAL ENCOUNTER: ICD-10-CM

## 2022-08-18 DIAGNOSIS — S16.1XXA NECK STRAIN, INITIAL ENCOUNTER: ICD-10-CM

## 2022-08-18 PROCEDURE — 99284 EMERGENCY DEPT VISIT MOD MDM: CPT

## 2022-08-18 PROCEDURE — 72125 CT NECK SPINE W/O DYE: CPT | Performed by: STUDENT IN AN ORGANIZED HEALTH CARE EDUCATION/TRAINING PROGRAM

## 2022-08-18 PROCEDURE — 70450 CT HEAD/BRAIN W/O DYE: CPT | Performed by: STUDENT IN AN ORGANIZED HEALTH CARE EDUCATION/TRAINING PROGRAM

## 2022-08-18 PROCEDURE — 72072 X-RAY EXAM THORAC SPINE 3VWS: CPT | Performed by: STUDENT IN AN ORGANIZED HEALTH CARE EDUCATION/TRAINING PROGRAM

## 2022-08-18 PROCEDURE — 72110 X-RAY EXAM L-2 SPINE 4/>VWS: CPT | Performed by: STUDENT IN AN ORGANIZED HEALTH CARE EDUCATION/TRAINING PROGRAM

## 2022-08-18 PROCEDURE — 12002 RPR S/N/AX/GEN/TRNK2.6-7.5CM: CPT

## 2022-08-18 PROCEDURE — 90471 IMMUNIZATION ADMIN: CPT

## 2022-08-18 RX ORDER — HYDROCODONE BITARTRATE AND ACETAMINOPHEN 5; 325 MG/1; MG/1
1 TABLET ORAL ONCE
Status: COMPLETED | OUTPATIENT
Start: 2022-08-18 | End: 2022-08-18

## 2022-08-19 VITALS
TEMPERATURE: 99 F | BODY MASS INDEX: 25.86 KG/M2 | WEIGHT: 137 LBS | DIASTOLIC BLOOD PRESSURE: 82 MMHG | RESPIRATION RATE: 18 BRPM | HEART RATE: 92 BPM | SYSTOLIC BLOOD PRESSURE: 126 MMHG | OXYGEN SATURATION: 95 % | HEIGHT: 61 IN

## 2022-08-19 RX ORDER — TETANUS AND DIPHTHERIA TOXOIDS ADSORBED 2; 2 [LF]/.5ML; [LF]/.5ML
0.5 INJECTION INTRAMUSCULAR ONCE
Status: COMPLETED | OUTPATIENT
Start: 2022-08-19 | End: 2022-08-19

## 2022-08-19 RX ORDER — TETANUS AND DIPHTHERIA TOXOIDS ADSORBED 2; 2 [LF]/.5ML; [LF]/.5ML
0.5 INJECTION INTRAMUSCULAR ONCE
Status: DISCONTINUED | OUTPATIENT
Start: 2022-08-19 | End: 2022-08-19

## 2022-08-19 NOTE — ED QUICK NOTES
Pt reports feeling a little weak after getting up from nap in chair. Pt tripped and fell into TV. Pt denies any LOC.

## 2022-08-19 NOTE — ED INITIAL ASSESSMENT (HPI)
Pt presents to ED with c/o mechanical fall with head laceration. Pt reports on blood thinners, no such meds found by EMS in apartment. Bleeding well controlled on EMS arrival and arrival to ED.

## (undated) DIAGNOSIS — C67.2 MALIGNANT NEOPLASM OF LATERAL WALL OF URINARY BLADDER (HCC): Primary | ICD-10-CM

## (undated) DEVICE — ENDOSCOPIC ULTRASOUND FINE NEEDLE BIOPSY (FNB) DEVICE: Brand: ACQUIRE

## (undated) DEVICE — LAPAROTOMY SPONGE - RF AND X-RAY DETECTABLE PRE-WASHED: Brand: SITUATE

## (undated) DEVICE — 3.0MM PRECISION NEURO (MATCH HEAD)

## (undated) DEVICE — DRAPE C-ARM UNIVERSAL

## (undated) DEVICE — FORCEP RADIAL JAW 4

## (undated) DEVICE — GLOVE SURG TRIUMPH SZ 9

## (undated) DEVICE — VIOLET BRAIDED (POLYGLACTIN 910), SYNTHETIC ABSORBABLE SUTURE: Brand: COATED VICRYL

## (undated) DEVICE — WRAP COOLING BACK W/NO PILLOW

## (undated) DEVICE — LAMINECTOMY CDS: Brand: MEDLINE INDUSTRIES, INC.

## (undated) DEVICE — SOL  .9 1000ML BTL

## (undated) DEVICE — ENDOSCOPY PACK - LOWER: Brand: MEDLINE INDUSTRIES, INC.

## (undated) DEVICE — 1200CC GUARDIAN II: Brand: GUARDIAN

## (undated) DEVICE — FILTERLINE NASAL ADULT O2/CO2

## (undated) DEVICE — SUTURE VICRYL 2-0 CP-2

## (undated) DEVICE — SUTURE MONOCRYL 3-0 PS-2

## (undated) DEVICE — 3M™ STERI-DRAPE™ U-DRAPE 1015: Brand: STERI-DRAPE™

## (undated) DEVICE — SOLUTION ANSEP 70% ISOPRPNL

## (undated) DEVICE — Device

## (undated) DEVICE — CODMAN® SURGICAL PATTIES 1/2" X1 1/2" (1.27CM X 3.81CM): Brand: CODMAN®

## (undated) DEVICE — TRANSPOSAL ULTRAFLEX DUO/QUAD ULTRA CART MANIFOLD

## (undated) DEVICE — KENDALL SCD EXPRESS SLEEVES, THIGH LENGTH, MEDIUM: Brand: KENDALL SCD

## (undated) DEVICE — SYRINGE 30ML LL TIP

## (undated) DEVICE — OCCLUSIVE GAUZE STRIP OVERWRAP,3% BISMUTH TRIBROMOPHENATE IN PETROLATUM BLEND: Brand: XEROFORM

## (undated) DEVICE — DRILL SRG OIL CRTDG MAESTRO

## (undated) DEVICE — 3M™ RED DOT™ MONITORING ELECTRODE WITH FOAM TAPE AND STICKY GEL, 50/BAG, 20/CASE, 72/PLT 2570: Brand: RED DOT™

## (undated) DEVICE — LIGHT HANDLE

## (undated) DEVICE — CODMAN® SURGICAL PATTIES 1/2" X 1/2" (1.27CM X 1.27CM): Brand: CODMAN®

## (undated) DEVICE — GLOVE SURG TRIUMPH SZ 7

## (undated) DEVICE — FLOSEAL SEALENT STERILE 10ML

## (undated) DEVICE — GLOVE ORTHO ALOETOUCH SZ 7

## (undated) DEVICE — C-ARMOR C-ARM EQUIPMENT COVERS CLEAR STERILE UNIVERSAL FIT 12 PER CASE: Brand: C-ARMOR

## (undated) DEVICE — SUTURE PROLENE 5-0 RB-2

## (undated) DEVICE — SUTURE PROLENE 2-0 CT-1

## (undated) DEVICE — GLOVE BIOGEL M SURG SZ 9

## (undated) NOTE — LETTER
[facility]  Phone: [phone]  Right Fax: [phone]  hospitals [address] Flores RN Date: 3/8/17    Patient Name: Ira Dent  Surgery Date: 3/14/2017    CSN: [id number]  Medical Record: [id number]   :

## (undated) NOTE — IP AVS SNAPSHOT
Patient Demographics     Address Phone    [address] [phone] (Home)  [phone] (Mobile) *Preferred*      Emergency Contact(s)     Name Relation Home Work Mobile    [place] OTHER [phone]      Chiara Iverson ? Anticipate wearing for 6-12 weeks after surgery; exact time to be determined by surgeon. Discuss at office visit.   ? Put brace on:  o  when sitting/standing at side of bed            Incision site care and dressing  Changes as directed by your surgeon taking narcotics to prevent constipation; use laxatives such as Miralax or Milk of Magnesia as needed. ? An enema or suppository may be needed if above measures do not work. No smoking  ? Smoking will inhibit the spine from healing with a solid fusion. ? Increased pain, swelling, redness, or any drainage to incision. ? Separation of incision. .  ? Sudden reappearance of pain that won’t go away with pain medication. ? New numbness or weakness to arms or legs. ?  Difficulty urinating or having bowel movem [phone]           Your medication list      TAKE these medications        Instructions Authorizing Provider    Morning Afternoon Evening As Needed    acetaminophen 500 MG Chew   Commonly known as:  TYLENOL        Chew 500 mg by mouth every 6 (six) essence Pioglitazone HCl 15 MG Tabs   Commonly known as:  ACTOS        Take 1 tablet (15 mg total) by mouth daily.     Jose Curtis                           valsartan 80 MG Tabs   Commonly known as:  DIOVAN        Take 1 tablet (80 mg total) by mouth d [id number] bisacodyl (DULCOLAX) rectal suppository 10 mg 03/17/17 1527 Given      [id number] diphenhydrAMINE (BENADRYL) cap/tab 25 mg (Or Linked Group #1) 03/16/17 1648 Given      [id number] docusate sodium (COLACE) cap 100 mg 03/16/17 2124 Given      [id number] [address] [facility] [id number] 02/03/16 1201 - Present            Microbiology Results (All)     None         H&P - H&P Note      H&P by ALEJANDRO Trotter at 3/14/2017 11:25 AM  Version 1 of 1    Author:  ALEJANDRO Trotter Service:  (none) Author Type:  Physici   CAROTID ENDARTERECTOMY  Bilateral  [id number]       History reviewed.  No pertinent family history.    Social History    Marital Status:              Spouse Name:                        Years of [place] Number of children:           GI: Denies nausea, vomiting, constipation, diarrhea; no rectal bleeding; no heartburn, no melana. : No dysuria, urgency or frequency; no hematuria.  no increase in night urination no in-ablitity to completely empty bladder  NEURO: no balance problems, no No sustained ankle clonus bilateral    Nonorganic Pain Signs:  nondermatomal pain distribution, distraction, overreaction, nonantomic tenderness negative  HEENT:   No hoarseness, No difficulty swallowing. Exam is unremarkable. Without scleral icterus.   Silvana Kimbrough Consults - MD Consult Notes      Consults by Mt Espinal MD at 3/16/2017  5:29 PM     Author:  Mt Espinal MD Service:  (none) Author Type:  Physician    Filed:  3/16/2017  5:34 PM Note Time:  3/16/2017  5:29 PM Status:  Signed    : Past Medical History   Diagnosis Date   • High blood pressure    • High cholesterol    • Diabetes mellitus type 2 in nonobese [facility])    • Coronary atherosclerosis    • Valvular disease      leaky valve   • Disorder of thyroid    • Cataract    • Back problem Oral Daily PRN   bisacodyl (DULCOLAX) rectal suppository 10 mg 10 mg Rectal Daily PRN   [] FLEET ENEMA (FLEET) 7-19 GM/118ML enema 133 mL 1 enema Rectal Once PRN   [] ondansetron HCl (ZOFRAN) injection 4 mg 4 mg Intravenous Q4H PRN   Metoclop mcg Intravenous Q5 Min PRN   [DISCONTINUED] ondansetron HCl (ZOFRAN) injection 4 mg 4 mg Intravenous PRN   [DISCONTINUED] Bupivacaine-EPINEPHrine (PF) (MARCAINE) 0.5% -1:009926 injection   PRN   [DISCONTINUED] Gentamicin Sulfate (GARAMYCIN) 40 MG/ML inject Net    627 ml       Physical Exam:                                      General: Alert, cooperative, no distress, appears stated age. Head:  Normocephalic, without obvious abnormality, atraumatic. Eyes:  Conjunctivae/lids clear. PERRL, EOMs intact.  Visi Based on patient's current functional status, pt would benefit from subacute Rehabilitation, working with PT/OT to upgrade present functional status, provide family training, assess home equipment and assistive device needs. Past Surgical History    APPENDECTOMY      CATARACT      LAPAROSCOPIC CHOLECYSTECTOMY      CAROTID ENDARTERECTOMY Bilateral 2006    REMOVAL GALLBLADDER      NDSC ABLATION & RCNSTJ ATRIA LMTD W/O BYPASS      HERNIA SURGERY      Comment x2       SUBJECTI Distance (ft): 150 ft. + 50 ft with chair follow for last 50 ft.    Assistive Device: Rolling walker  Pattern: Shuffle  Stoop/Curb Assistance: Not tested  Comment : Above score was based on FIM definitions    Skilled Therapy Provided: Patient was instructed Goal #2   Patient is able to demonstrate transfers EOB to/from Chair/Wheelchair at assistance level: supervision       Goal #3   Patient is able to ambulate 150 feet with assist device: walker - rolling at assistance level: supervision     Goal #4        G Patient’s self-stated goal is to be able to walk without pain    OBJECTIVE  Precautions: Spine    WEIGHT BEARING RESTRICTION  Weight Bearing Restriction: None                PAIN ASSESSMENT   Ratin  Location: Low back @ surgical site & bilateral LEs, t extra time & chair to follow close. Patient was able to perform toilet transfers with Supervision. Patient needed CGA to return to bed on right side lying. Patient was assisted to keep pillow between knees & behind back against cold packs. Daughter was prese Physical Therapy Note by Bianca Mcfarland PT at 3/15/2017  4:05 PM  Version 1 of 1    Author:  Bianca Mcfarland PT Service:  (none) Author Type:  Physical Therapist    Filed:  3/15/2017  4:13 PM Note Time:  3/15/2017  4:05 PM Status:  Signed    : Patient Regularly Uses: Glasses    Prior Level of [place]: Patient was independent with ADLs & self care. Ambulated without AD. Daughter lives with patient & will be able to assist as needed. SUBJECTIVE  \"It hurts my legs more than my back. \" Patie -   Moving from lying on back to sitting on the side of the bed?: A Little   How much help from another person does the patient currently need. ..   -   Moving to and from a bed to a chair (including a wheelchair)?: A Little   -   Need to walk in hospital r following impairments: Decreased general strength,c/o post surgical pain + bilateral LE pain . These impairments manifest themselves as functional limitations in bed mobility, functional transfers & gait skills.   The patient is below her baseline and woul Filed:  3/17/2017  2:33 PM Note Time:  3/17/2017  9:45 AM Status:  Signed    :  Radha Hu OT (Occupational Therapist)           OCCUPATIONAL THERAPY TREATMENT NOTE - INPATIENT     Room Number: 380/380-A  Session: 1   Number of Visits to Me No shortness of breath    ACTIVITIES OF DAILY LIVING ASSESSMENT  AM-PAC ‘6-Clicks’ Inpatient Daily Activity Short Form  How much help from another person does the patient currently need…  -   Putting on and taking off regular lower body clothing?: A Lot  - fatigue and pain; reinforcement on AE for sock/shoe management, however patient again not agreeable to attempting this or any other activityat this time due to fatigue and pain.  Patient also reinforced on OT role, safety, fall prevention, pain management, in progress       Occupational Therapy Note by Kevin Rizzo OT at 3/16/2017 10:00 AM  Version 1 of 1    Author:  Kevin Rizzo OT Service:  (none) Author Type:  Occupational Therapist    Filed:  3/16/2017 10:35 AM Note Time:  3/16/2017 10:00 A Occupation/Status: Retired  Hand Dominance: Right  Drives: No  Patient Regularly Uses: Glasses    Prior Level of [place]: Patient reports independent in all I/ADL and functional mobility without device prior to admission.  She enjoys reading vitalclipo Approx Degree of Impairment: 50.11%  Standardized Score (AM-PAC Scale): 37.26  CMS Modifier (G-Code): CK    FUNCTIONAL TRANSFER ASSESSMENT  Supine to Sit : Not tested  Sit to Stand: Minimum assistance (CGA)    Skilled Therapy Provided: Sitting up in chair tub transfers and possible recommendation of transfer tub bench pending progress. Patient and daughter also educated on OT role, safety, fall prevention, pain management, discharge recommendations with good verbal understanding. Will continue to follow. ADL GOALS   Patient will perform Lower Body Dressing with supervision  Patient will perform Toileting with supervision    FUNCTIONAL TRANSFER GOALS   Patient will transfer to Toilet with supervision    ADDITIONAL GOALS  Patient will recall all spinal preca

## (undated) NOTE — IP AVS SNAPSHOT
[facility] [place] [address] [place], [address] ~ [phone]                Discharge Summary   3/14/2017    Desean Kim           Admission Information        Provider Department    3/14/2017 Sherrie Barcenas MD  3sw-A acetaminophen 500 MG Chew   Commonly known as:  TYLENOL        Chew 500 mg by mouth every 6 (six) hours as needed for Pain.                             Atorvastatin Calcium 20 MG Tabs   Last time this was given:  20 mg on 3/16/2017  9:23 PM   Commonly know - HYDROcodone-acetaminophen 5-325 MG Tabs              Patient Instructions       Lumbar Spinal Fusion Discharge Instructions ( Dr. Radha Salazar)    Activity  Restrictions  ? No twisting, pulling, pushing or lifting > 10 lbs.   ? No lifting anything over your ? Watch incision for any redness, drainage, increased warmth or opening of the incision. Call surgeon if you notice any of these.  ? No lotions or ointments on or near incision. ?  Steri-strips may be under dressing; these will gradually peel off  by the Pain Management  ? Relaxation techniques  o A way to focus your attention other than on your pain. Your brain makes endorphins that are a natural body chemical that can decrease pain.  Some examples of relaxation techniques are deep breathing, listening to RESUME ASPIRIN WHEN OK WITH DR. TERAN    Please check blood work (CBC) at the follow up with Dr. Wesley Mendez MD   Your blood work at the hospital revealed anemia (hemoglobin 10.4) and Thrombocytopenia (platelet 93)    Please check fasting WBC RBC Hemoglobin Hematocrit MCV MCH MCHC RDW Platelet MPV    (98/20/86)  4.9 (03/17/17)  3.02 (L) (03/17/17)  9.8 (L) (03/17/17)  29.8 (L) (03/17/17)  98.7 -- -- -- (03/17/17)  107.0 (L) (03/06/17)  10.7    (03/16/17)  6.8 (03/16/17)  3.23 (L) (03/16/17 - If you have concerns related to behavioral health issues or thoughts of harming yourself, contact [address] [place] [address] at [phone].     - If you don’t have insurance, Marychuy Romero has partnered with Patient Niko Niko Domingo weakness, numbness           Cholesterol Lowering Medications     Atorvastatin Calcium 20 MG Oral Tab       Use: Lower cholesterol, protect your heart   Most common side effects: Dizziness, constipation, abnormal liver function   What to report to your hea (itching, rash, hives)   What to report to your healthcare team: Pain, nausea/vomiting, no bowel movement in 2+ days, diarrhea           Endocrine Medications     Levothyroxine Sodium 75 MCG Oral Tab         Use: Regulate metabolism and inflammation   Most